# Patient Record
Sex: FEMALE | Race: BLACK OR AFRICAN AMERICAN | NOT HISPANIC OR LATINO | URBAN - METROPOLITAN AREA
[De-identification: names, ages, dates, MRNs, and addresses within clinical notes are randomized per-mention and may not be internally consistent; named-entity substitution may affect disease eponyms.]

---

## 2023-09-20 ENCOUNTER — APPOINTMENT (RX ONLY)
Dept: URBAN - METROPOLITAN AREA CLINIC 147 | Facility: CLINIC | Age: 28
Setting detail: DERMATOLOGY
End: 2023-09-20

## 2024-01-18 ENCOUNTER — APPOINTMENT (RX ONLY)
Dept: URBAN - METROPOLITAN AREA CLINIC 334 | Facility: CLINIC | Age: 29
Setting detail: DERMATOLOGY
End: 2024-01-18

## 2024-01-18 DIAGNOSIS — L68.0 HIRSUTISM: ICD-10-CM

## 2024-01-18 PROCEDURE — ? COUNSELING

## 2024-01-18 PROCEDURE — ? LASER HAIR REMOVAL

## 2024-01-18 ASSESSMENT — LOCATION DETAILED DESCRIPTION DERM
LOCATION DETAILED: LEFT CHIN
LOCATION DETAILED: SUBMENTAL CHIN

## 2024-01-18 ASSESSMENT — LOCATION ZONE DERM: LOCATION ZONE: FACE

## 2024-01-18 ASSESSMENT — LOCATION SIMPLE DESCRIPTION DERM
LOCATION SIMPLE: SUBMENTAL CHIN
LOCATION SIMPLE: CHIN

## 2024-01-18 NOTE — PROCEDURE: LASER HAIR REMOVAL
Consent: Written consent obtained, risks reviewed including but not limited to crusting, scabbing, blistering, scarring, darker or lighter pigmentary change, paradoxical hair regrowth, incomplete removal of hair and infection.
Number Of Prepaid Treatments (Will Not Render If 0): 0
Spot Size: 7 mm
Detail Level: Detailed
Post-Procedure Care: Immediate endpoint: perifollicular erythema and edema. Vaseline and ice applied. Post care reviewed with patient.
Anesthesia Type: 1% lidocaine with epinephrine
Cooling: DCD setting
Laser Type: Nd:Yag 1064nm
Post-Care Instructions: I reviewed with the patient in detail post-care instructions. Patient should avoid sun for a minimum of 4 weeks before and after treatment.
Were Eye Shields Employed?: No
Fluence (Will Not Render If 0): 18
Fluence (Will Not Render If 0): 20
Price (Use Numbers Only, No Special Characters Or $): 200
Topical Anesthesia Type: topical 4% lidocaine
Eye Shield Text: Given the treatment area eye shields were inserted prior to treatment.
Pre-Procedure: Prior to proceeding the treatment areas were cleaned and all present put on their eye protection.

## 2024-02-22 ENCOUNTER — APPOINTMENT (RX ONLY)
Dept: URBAN - METROPOLITAN AREA CLINIC 334 | Facility: CLINIC | Age: 29
Setting detail: DERMATOLOGY
End: 2024-02-22

## 2024-02-22 DIAGNOSIS — L68.0 HIRSUTISM: ICD-10-CM

## 2024-02-22 PROCEDURE — ? COUNSELING

## 2024-02-22 PROCEDURE — ? LASER HAIR REMOVAL

## 2024-02-22 PROCEDURE — ? ADDITIONAL NOTES

## 2024-02-22 ASSESSMENT — LOCATION DETAILED DESCRIPTION DERM
LOCATION DETAILED: LEFT CHIN
LOCATION DETAILED: SUBMENTAL CHIN

## 2024-02-22 ASSESSMENT — LOCATION ZONE DERM: LOCATION ZONE: FACE

## 2024-02-22 ASSESSMENT — LOCATION SIMPLE DESCRIPTION DERM
LOCATION SIMPLE: CHIN
LOCATION SIMPLE: SUBMENTAL CHIN

## 2024-02-22 NOTE — PROCEDURE: LASER HAIR REMOVAL
Consent: Written consent obtained, risks reviewed including but not limited to crusting, scabbing, blistering, scarring, darker or lighter pigmentary change, paradoxical hair regrowth, incomplete removal of hair and infection.
Number Of Prepaid Treatments (Will Not Render If 0): 0
Spot Size: 18 mm
Detail Level: Detailed
Post-Procedure Care: Immediate endpoint: perifollicular erythema and edema. Vaseline and ice applied. Post care reviewed with patient.
Anesthesia Type: 1% lidocaine with epinephrine
Cooling: DCD setting
Length Of Topical Anesthesia Application (Optional): 20 minutes
Laser Type: Nd:Yag 1064nm
Post-Care Instructions: I reviewed with the patient in detail post-care instructions. Patient should avoid sun for a minimum of 4 weeks before and after treatment.
Were Eye Shields Employed?: No
Fluence (Will Not Render If 0): 20
Price (Use Numbers Only, No Special Characters Or $): 200
Treatment Number: 2
Topical Anesthesia Type: topical 4% lidocaine
Eye Shield Text: Given the treatment area eye shields were inserted prior to treatment.
Pre-Procedure: Prior to proceeding the treatment areas were cleaned and all present put on their eye protection.

## 2024-02-22 NOTE — PROCEDURE: ADDITIONAL NOTES
Additional Notes: Topical lido applied for 10 min prior to laser
Detail Level: Simple
Render Risk Assessment In Note?: no

## 2024-05-31 ENCOUNTER — APPOINTMENT (RX ONLY)
Dept: URBAN - METROPOLITAN AREA CLINIC 334 | Facility: CLINIC | Age: 29
Setting detail: DERMATOLOGY
End: 2024-05-31

## 2024-05-31 DIAGNOSIS — L68.0 HIRSUTISM: ICD-10-CM

## 2024-05-31 PROCEDURE — ? ADDITIONAL NOTES

## 2024-05-31 PROCEDURE — ? COUNSELING

## 2024-05-31 PROCEDURE — ? LASER HAIR REMOVAL

## 2024-05-31 ASSESSMENT — LOCATION DETAILED DESCRIPTION DERM
LOCATION DETAILED: SUBMENTAL CHIN
LOCATION DETAILED: LEFT CHIN

## 2024-05-31 ASSESSMENT — LOCATION SIMPLE DESCRIPTION DERM
LOCATION SIMPLE: SUBMENTAL CHIN
LOCATION SIMPLE: CHIN

## 2024-05-31 ASSESSMENT — LOCATION ZONE DERM: LOCATION ZONE: FACE

## 2024-05-31 NOTE — PROCEDURE: LASER HAIR REMOVAL
Consent: Written consent obtained, risks reviewed including but not limited to crusting, scabbing, blistering, scarring, darker or lighter pigmentary change, paradoxical hair regrowth, incomplete removal of hair and infection.
Number Of Prepaid Treatments (Will Not Render If 0): 0
Spot Size: 18 mm
Detail Level: Detailed
Post-Procedure Care: Immediate endpoint: perifollicular erythema and edema. Vaseline and ice applied. Post care reviewed with patient.
Anesthesia Type: 1% lidocaine with epinephrine
Cooling: DCD setting
Length Of Topical Anesthesia Application (Optional): 20 minutes
Laser Type: Nd:Yag 1064nm
Post-Care Instructions: I reviewed with the patient in detail post-care instructions. Patient should avoid sun for a minimum of 4 weeks before and after treatment.
Were Eye Shields Employed?: No
Fluence (Will Not Render If 0): 20
Price (Use Numbers Only, No Special Characters Or $): 200
Treatment Number: 3
Topical Anesthesia Type: topical 4% lidocaine
Eye Shield Text: Given the treatment area eye shields were inserted prior to treatment.
Pre-Procedure: Prior to proceeding the treatment areas were cleaned and all present put on their eye protection.

## 2024-07-19 ENCOUNTER — APPOINTMENT (RX ONLY)
Dept: URBAN - METROPOLITAN AREA CLINIC 334 | Facility: CLINIC | Age: 29
Setting detail: DERMATOLOGY
End: 2024-07-19

## 2024-07-19 DIAGNOSIS — L68.0 HIRSUTISM: ICD-10-CM

## 2024-07-19 PROCEDURE — ? ADDITIONAL NOTES

## 2024-07-19 PROCEDURE — ? LASER HAIR REMOVAL

## 2024-07-19 PROCEDURE — ? COUNSELING

## 2024-07-19 ASSESSMENT — LOCATION DETAILED DESCRIPTION DERM
LOCATION DETAILED: SUBMENTAL CHIN
LOCATION DETAILED: LEFT CHIN

## 2024-07-19 ASSESSMENT — LOCATION SIMPLE DESCRIPTION DERM
LOCATION SIMPLE: SUBMENTAL CHIN
LOCATION SIMPLE: CHIN

## 2024-07-19 ASSESSMENT — LOCATION ZONE DERM: LOCATION ZONE: FACE

## 2024-07-19 NOTE — PROCEDURE: LASER HAIR REMOVAL
Consent: Written consent obtained, risks reviewed including but not limited to crusting, scabbing, blistering, scarring, darker or lighter pigmentary change, paradoxical hair regrowth, incomplete removal of hair and infection.
Number Of Prepaid Treatments (Will Not Render If 0): 0
Spot Size: 18 mm
Detail Level: Detailed
Post-Procedure Care: Immediate endpoint: perifollicular erythema and edema. Vaseline and ice applied. Post care reviewed with patient.
Anesthesia Type: 1% lidocaine with epinephrine
Cooling: DCD setting
Length Of Topical Anesthesia Application (Optional): 20 minutes
Laser Type: Nd:Yag 1064nm
Post-Care Instructions: I reviewed with the patient in detail post-care instructions. Patient should avoid sun for a minimum of 4 weeks before and after treatment.
Were Eye Shields Employed?: No
Fluence (Will Not Render If 0): 18
Fluence (Will Not Render If 0): 20
Price (Use Numbers Only, No Special Characters Or $): 200
Treatment Number: 5
Topical Anesthesia Type: topical 4% lidocaine
Eye Shield Text: Given the treatment area eye shields were inserted prior to treatment.
Pre-Procedure: Prior to proceeding the treatment areas were cleaned and all present put on their eye protection.

## 2024-09-11 ENCOUNTER — APPOINTMENT (RX ONLY)
Dept: URBAN - METROPOLITAN AREA CLINIC 334 | Facility: CLINIC | Age: 29
Setting detail: DERMATOLOGY
End: 2024-09-11

## 2024-09-11 DIAGNOSIS — L68.0 HIRSUTISM: ICD-10-CM

## 2024-09-11 PROCEDURE — ? COUNSELING

## 2024-09-11 PROCEDURE — ? LASER HAIR REMOVAL

## 2024-09-11 PROCEDURE — ? ADDITIONAL NOTES

## 2024-09-11 ASSESSMENT — LOCATION SIMPLE DESCRIPTION DERM
LOCATION SIMPLE: SUBMENTAL CHIN
LOCATION SIMPLE: CHIN

## 2024-09-11 ASSESSMENT — LOCATION DETAILED DESCRIPTION DERM
LOCATION DETAILED: LEFT CHIN
LOCATION DETAILED: SUBMENTAL CHIN

## 2024-09-11 ASSESSMENT — LOCATION ZONE DERM: LOCATION ZONE: FACE

## 2024-09-11 NOTE — PROCEDURE: LASER HAIR REMOVAL
Consent: Written consent obtained, risks reviewed including but not limited to crusting, scabbing, blistering, scarring, darker or lighter pigmentary change, paradoxical hair regrowth, incomplete removal of hair and infection.
Number Of Prepaid Treatments (Will Not Render If 0): 0
Spot Size: 18 mm
Detail Level: Detailed
Post-Procedure Care: Immediate endpoint: perifollicular erythema and edema. Vaseline and ice applied. Post care reviewed with patient.
Anesthesia Type: 1% lidocaine with epinephrine
Cooling: DCD setting
Length Of Topical Anesthesia Application (Optional): 20 minutes
Laser Type: Nd:Yag 1064nm
Post-Care Instructions: I reviewed with the patient in detail post-care instructions. Patient should avoid sun for a minimum of 4 weeks before and after treatment.
Were Eye Shields Employed?: No
Fluence (Will Not Render If 0): 18
Fluence (Will Not Render If 0): 20
Price (Use Numbers Only, No Special Characters Or $): 200
Treatment Number: 6
Topical Anesthesia Type: topical 4% lidocaine
Eye Shield Text: Given the treatment area eye shields were inserted prior to treatment.
Pre-Procedure: Prior to proceeding the treatment areas were cleaned and all present put on their eye protection.

## 2024-10-24 ENCOUNTER — APPOINTMENT (RX ONLY)
Dept: URBAN - METROPOLITAN AREA CLINIC 334 | Facility: CLINIC | Age: 29
Setting detail: DERMATOLOGY
End: 2024-10-24

## 2024-10-24 DIAGNOSIS — L68.0 HIRSUTISM: ICD-10-CM

## 2024-10-24 PROCEDURE — ? COUNSELING

## 2024-10-24 PROCEDURE — ? LASER HAIR REMOVAL

## 2024-10-24 PROCEDURE — ? ADDITIONAL NOTES

## 2024-10-24 ASSESSMENT — LOCATION ZONE DERM: LOCATION ZONE: FACE

## 2024-10-24 ASSESSMENT — LOCATION DETAILED DESCRIPTION DERM
LOCATION DETAILED: SUBMENTAL CHIN
LOCATION DETAILED: LEFT CHIN

## 2024-10-24 ASSESSMENT — LOCATION SIMPLE DESCRIPTION DERM
LOCATION SIMPLE: SUBMENTAL CHIN
LOCATION SIMPLE: CHIN

## 2024-10-24 NOTE — PROCEDURE: LASER HAIR REMOVAL
Consent: Written consent obtained, risks reviewed including but not limited to crusting, scabbing, blistering, scarring, darker or lighter pigmentary change, paradoxical hair regrowth, incomplete removal of hair and infection.
Number Of Prepaid Treatments (Will Not Render If 0): 0
Spot Size: 18 mm
Detail Level: Detailed
Post-Procedure Care: Immediate endpoint: perifollicular erythema and edema. Vaseline and ice applied. Post care reviewed with patient.
Anesthesia Type: 1% lidocaine with epinephrine
Cooling: DCD setting
Length Of Topical Anesthesia Application (Optional): 20 minutes
Laser Type: Nd:Yag 1064nm
Post-Care Instructions: I reviewed with the patient in detail post-care instructions. Patient should avoid sun for a minimum of 4 weeks before and after treatment.
Were Eye Shields Employed?: No
Fluence (Will Not Render If 0): 18
Fluence (Will Not Render If 0): 20
Price (Use Numbers Only, No Special Characters Or $): 200
Treatment Number: 7
Topical Anesthesia Type: topical 4% lidocaine
Eye Shield Text: Given the treatment area eye shields were inserted prior to treatment.
Pre-Procedure: Prior to proceeding the treatment areas were cleaned and all present put on their eye protection.

## 2025-01-02 ENCOUNTER — APPOINTMENT (OUTPATIENT)
Dept: URBAN - METROPOLITAN AREA CLINIC 334 | Facility: CLINIC | Age: 30
Setting detail: DERMATOLOGY
End: 2025-01-02

## 2025-01-02 ENCOUNTER — APPOINTMENT (OUTPATIENT)
Dept: URBAN - METROPOLITAN AREA CLINIC 328 | Facility: CLINIC | Age: 30
Setting detail: DERMATOLOGY
End: 2025-01-02

## 2025-01-02 DIAGNOSIS — Z02.9 ENCOUNTER FOR ADMINISTRATIVE EXAMINATIONS, UNSPECIFIED: ICD-10-CM

## 2025-01-31 ENCOUNTER — APPOINTMENT (OUTPATIENT)
Dept: URBAN - METROPOLITAN AREA CLINIC 334 | Facility: CLINIC | Age: 30
Setting detail: DERMATOLOGY
End: 2025-01-31

## 2025-01-31 DIAGNOSIS — L68.0 HIRSUTISM: ICD-10-CM

## 2025-01-31 PROCEDURE — ? COUNSELING

## 2025-01-31 PROCEDURE — ? ADDITIONAL NOTES

## 2025-01-31 PROCEDURE — ? LASER HAIR REMOVAL

## 2025-01-31 ASSESSMENT — LOCATION DETAILED DESCRIPTION DERM
LOCATION DETAILED: SUBMENTAL CHIN
LOCATION DETAILED: LEFT CHIN

## 2025-01-31 ASSESSMENT — LOCATION SIMPLE DESCRIPTION DERM
LOCATION SIMPLE: SUBMENTAL CHIN
LOCATION SIMPLE: CHIN

## 2025-01-31 ASSESSMENT — LOCATION ZONE DERM: LOCATION ZONE: FACE

## 2025-01-31 NOTE — PROCEDURE: LASER HAIR REMOVAL
Consent: Written consent obtained, risks reviewed including but not limited to crusting, scabbing, blistering, scarring, darker or lighter pigmentary change, paradoxical hair regrowth, incomplete removal of hair and infection.
Number Of Prepaid Treatments (Will Not Render If 0): 0
Spot Size: 18 mm
Detail Level: Detailed
Post-Procedure Care: Immediate endpoint: perifollicular erythema and edema. Vaseline and ice applied. Post care reviewed with patient.
Anesthesia Type: 1% lidocaine with epinephrine
Cooling: DCD setting
Length Of Topical Anesthesia Application (Optional): 20 minutes
Laser Type: Nd:Yag 1064nm
Post-Care Instructions: I reviewed with the patient in detail post-care instructions. Patient should avoid sun for a minimum of 4 weeks before and after treatment.
Were Eye Shields Employed?: No
Fluence (Will Not Render If 0): 18
Fluence (Will Not Render If 0): 20
Price (Use Numbers Only, No Special Characters Or $): 200
Treatment Number: 8
Topical Anesthesia Type: topical 4% lidocaine
Eye Shield Text: Given the treatment area eye shields were inserted prior to treatment.
Pre-Procedure: Prior to proceeding the treatment areas were cleaned and all present put on their eye protection.

## 2025-03-27 ENCOUNTER — APPOINTMENT (OUTPATIENT)
Dept: URBAN - METROPOLITAN AREA CLINIC 334 | Facility: CLINIC | Age: 30
Setting detail: DERMATOLOGY
End: 2025-03-27

## 2025-03-27 DIAGNOSIS — L68.0 HIRSUTISM: ICD-10-CM

## 2025-03-27 PROCEDURE — ? COUNSELING

## 2025-03-27 PROCEDURE — ? LASER HAIR REMOVAL

## 2025-03-27 PROCEDURE — ? ADDITIONAL NOTES

## 2025-03-27 ASSESSMENT — LOCATION SIMPLE DESCRIPTION DERM
LOCATION SIMPLE: SUBMENTAL CHIN
LOCATION SIMPLE: CHIN

## 2025-03-27 ASSESSMENT — LOCATION DETAILED DESCRIPTION DERM
LOCATION DETAILED: LEFT CHIN
LOCATION DETAILED: SUBMENTAL CHIN

## 2025-03-27 ASSESSMENT — LOCATION ZONE DERM: LOCATION ZONE: FACE

## 2025-05-01 ENCOUNTER — APPOINTMENT (OUTPATIENT)
Dept: URBAN - METROPOLITAN AREA CLINIC 334 | Facility: CLINIC | Age: 30
Setting detail: DERMATOLOGY
End: 2025-05-01

## 2025-05-01 DIAGNOSIS — L68.0 HIRSUTISM: ICD-10-CM

## 2025-05-01 PROCEDURE — ? LASER HAIR REMOVAL

## 2025-05-01 ASSESSMENT — LOCATION DETAILED DESCRIPTION DERM
LOCATION DETAILED: SUBMENTAL CHIN
LOCATION DETAILED: LEFT CHIN

## 2025-05-01 ASSESSMENT — LOCATION SIMPLE DESCRIPTION DERM
LOCATION SIMPLE: SUBMENTAL CHIN
LOCATION SIMPLE: CHIN

## 2025-05-01 ASSESSMENT — LOCATION ZONE DERM: LOCATION ZONE: FACE

## 2025-05-01 NOTE — PROCEDURE: LASER HAIR REMOVAL
Consent: Written consent obtained, risks reviewed including but not limited to crusting, scabbing, blistering, scarring, darker or lighter pigmentary change, paradoxical hair regrowth, incomplete removal of hair and infection.
Number Of Prepaid Treatments (Will Not Render If 0): 0
Spot Size: 18 mm
Detail Level: Detailed
Post-Procedure Care: Immediate endpoint: perifollicular erythema and edema. Vaseline and ice applied. Post care reviewed with patient.
Anesthesia Type: 1% lidocaine with epinephrine
Cooling: DCD setting
Length Of Topical Anesthesia Application (Optional): 20 minutes
Laser Type: Nd:Yag 1064nm
Post-Care Instructions: I reviewed with the patient in detail post-care instructions. Patient should avoid sun for a minimum of 4 weeks before and after treatment.
Were Eye Shields Employed?: No
Fluence (Will Not Render If 0): 14
Fluence (Will Not Render If 0): 20
Price (Use Numbers Only, No Special Characters Or $): 200
Treatment Number: 9
Pulse Duration (Include Units): 10.00 ms
Topical Anesthesia Type: topical 4% lidocaine
Eye Shield Text: Given the treatment area eye shields were inserted prior to treatment.
Pre-Procedure: Prior to proceeding the treatment areas were cleaned and all present put on their eye protection.

## 2025-06-19 ENCOUNTER — APPOINTMENT (OUTPATIENT)
Dept: URBAN - METROPOLITAN AREA CLINIC 334 | Facility: CLINIC | Age: 30
Setting detail: DERMATOLOGY
End: 2025-06-19

## 2025-06-19 DIAGNOSIS — L68.0 HIRSUTISM: ICD-10-CM

## 2025-06-19 PROCEDURE — ? LASER HAIR REMOVAL

## 2025-06-19 ASSESSMENT — LOCATION SIMPLE DESCRIPTION DERM
LOCATION SIMPLE: CHIN
LOCATION SIMPLE: SUBMENTAL CHIN

## 2025-06-19 ASSESSMENT — LOCATION ZONE DERM: LOCATION ZONE: FACE

## 2025-06-19 ASSESSMENT — LOCATION DETAILED DESCRIPTION DERM
LOCATION DETAILED: LEFT CHIN
LOCATION DETAILED: SUBMENTAL CHIN

## 2025-06-19 NOTE — PROCEDURE: LASER HAIR REMOVAL
Consent: Written consent obtained, risks reviewed including but not limited to crusting, scabbing, blistering, scarring, darker or lighter pigmentary change, paradoxical hair regrowth, incomplete removal of hair and infection.
Number Of Prepaid Treatments (Will Not Render If 0): 0
Spot Size: 18 mm
Detail Level: Detailed
Post-Procedure Care: Immediate endpoint: perifollicular erythema and edema. Vaseline and ice applied. Post care reviewed with patient.
Anesthesia Type: 1% lidocaine with epinephrine
Cooling: DCD setting
Length Of Topical Anesthesia Application (Optional): 20 minutes
Laser Type: Nd:Yag 1064nm
Post-Care Instructions: I reviewed with the patient in detail post-care instructions. Patient should avoid sun for a minimum of 4 weeks before and after treatment.
Were Eye Shields Employed?: No
Fluence (Will Not Render If 0): 14
Fluence (Will Not Render If 0): 20
Price (Use Numbers Only, No Special Characters Or $): 200
Treatment Number: 10
Pulse Duration (Include Units): 10.00 ms
Topical Anesthesia Type: topical 4% lidocaine
Eye Shield Text: Given the treatment area eye shields were inserted prior to treatment.
Pre-Procedure: Prior to proceeding the treatment areas were cleaned and all present put on their eye protection.

## 2025-07-18 ENCOUNTER — APPOINTMENT (OUTPATIENT)
Dept: URBAN - METROPOLITAN AREA CLINIC 334 | Facility: CLINIC | Age: 30
Setting detail: DERMATOLOGY
End: 2025-07-18

## 2025-07-18 DIAGNOSIS — L68.0 HIRSUTISM: ICD-10-CM

## 2025-07-18 PROCEDURE — ? LASER HAIR REMOVAL

## 2025-07-18 ASSESSMENT — LOCATION SIMPLE DESCRIPTION DERM
LOCATION SIMPLE: SUBMENTAL CHIN
LOCATION SIMPLE: CHIN

## 2025-07-18 ASSESSMENT — LOCATION ZONE DERM: LOCATION ZONE: FACE

## 2025-07-18 ASSESSMENT — LOCATION DETAILED DESCRIPTION DERM
LOCATION DETAILED: SUBMENTAL CHIN
LOCATION DETAILED: LEFT CHIN

## 2025-07-18 NOTE — PROCEDURE: LASER HAIR REMOVAL
Consent: Written consent obtained, risks reviewed including but not limited to crusting, scabbing, blistering, scarring, darker or lighter pigmentary change, paradoxical hair regrowth, incomplete removal of hair and infection.
Number Of Prepaid Treatments (Will Not Render If 0): 0
Spot Size: 18 mm
Detail Level: Detailed
Post-Procedure Care: Immediate endpoint: perifollicular erythema and edema. Vaseline and ice applied. Post care reviewed with patient.
Anesthesia Type: 1% lidocaine with epinephrine
Cooling: DCD setting
Length Of Topical Anesthesia Application (Optional): 20 minutes
Laser Type: Nd:Yag 1064nm
Post-Care Instructions: I reviewed with the patient in detail post-care instructions. Patient should avoid sun for a minimum of 4 weeks before and after treatment.
Were Eye Shields Employed?: No
Fluence (Will Not Render If 0): 14
Fluence (Will Not Render If 0): 20
Price (Use Numbers Only, No Special Characters Or $): 200
Treatment Number: 11
Pulse Duration (Include Units): 10.00 ms
Topical Anesthesia Type: topical 4% lidocaine
Eye Shield Text: Given the treatment area eye shields were inserted prior to treatment.
Pre-Procedure: Prior to proceeding the treatment areas were cleaned and all present put on their eye protection.